# Patient Record
Sex: FEMALE | Race: WHITE | Employment: OTHER | URBAN - METROPOLITAN AREA
[De-identification: names, ages, dates, MRNs, and addresses within clinical notes are randomized per-mention and may not be internally consistent; named-entity substitution may affect disease eponyms.]

---

## 2022-06-29 ENCOUNTER — TELEPHONE (OUTPATIENT)
Dept: PULMONOLOGY | Age: 72
End: 2022-06-29

## 2022-06-30 ENCOUNTER — TELEPHONE (OUTPATIENT)
Dept: PULMONOLOGY | Age: 72
End: 2022-06-30

## 2022-06-30 ENCOUNTER — OFFICE VISIT (OUTPATIENT)
Dept: PULMONOLOGY | Age: 72
End: 2022-06-30
Payer: MEDICARE

## 2022-06-30 ENCOUNTER — HOSPITAL ENCOUNTER (OUTPATIENT)
Age: 72
Discharge: HOME | End: 2022-06-30
Payer: MEDICARE

## 2022-06-30 VITALS
OXYGEN SATURATION: 96 % | BODY MASS INDEX: 23.9 KG/M2 | DIASTOLIC BLOOD PRESSURE: 78 MMHG | SYSTOLIC BLOOD PRESSURE: 122 MMHG | HEART RATE: 84 BPM | HEIGHT: 64 IN | WEIGHT: 140 LBS

## 2022-06-30 DIAGNOSIS — Z01.818 PREOP TESTING: ICD-10-CM

## 2022-06-30 DIAGNOSIS — R91.1 PULMONARY NODULE: ICD-10-CM

## 2022-06-30 DIAGNOSIS — R91.1 PULMONARY NODULE: Primary | ICD-10-CM

## 2022-06-30 LAB
BASOPHILS ABSOLUTE: 0.1 K/UL (ref 0–0.2)
BASOPHILS RELATIVE PERCENT: 1.1 %
EOSINOPHILS ABSOLUTE: 1.7 K/UL (ref 0–0.6)
EOSINOPHILS RELATIVE PERCENT: 16.7 %
HCT VFR BLD CALC: 38.2 % (ref 36–48)
HEMOGLOBIN: 13.3 G/DL (ref 12–16)
INR BLD: 0.97 (ref 0.87–1.14)
LYMPHOCYTES ABSOLUTE: 2.5 K/UL (ref 1–5.1)
LYMPHOCYTES RELATIVE PERCENT: 24.6 %
MCH RBC QN AUTO: 30.9 PG (ref 26–34)
MCHC RBC AUTO-ENTMCNC: 34.8 G/DL (ref 31–36)
MCV RBC AUTO: 88.7 FL (ref 80–100)
MONOCYTES ABSOLUTE: 0.7 K/UL (ref 0–1.3)
MONOCYTES RELATIVE PERCENT: 6.5 %
NEUTROPHILS ABSOLUTE: 5.2 K/UL (ref 1.7–7.7)
NEUTROPHILS RELATIVE PERCENT: 51.1 %
PDW BLD-RTO: 14.2 % (ref 12.4–15.4)
PLATELET # BLD: 385 K/UL (ref 135–450)
PMV BLD AUTO: 6.9 FL (ref 5–10.5)
PROTHROMBIN TIME: 12.7 SEC (ref 11.7–14.5)
RBC # BLD: 4.31 M/UL (ref 4–5.2)
WBC # BLD: 10.2 K/UL (ref 4–11)

## 2022-06-30 PROCEDURE — 85025 COMPLETE CBC W/AUTO DIFF WBC: CPT

## 2022-06-30 PROCEDURE — 1123F ACP DISCUSS/DSCN MKR DOCD: CPT | Performed by: INTERNAL MEDICINE

## 2022-06-30 PROCEDURE — 36415 COLL VENOUS BLD VENIPUNCTURE: CPT

## 2022-06-30 PROCEDURE — 85610 PROTHROMBIN TIME: CPT

## 2022-06-30 PROCEDURE — 99214 OFFICE O/P EST MOD 30 MIN: CPT | Performed by: INTERNAL MEDICINE

## 2022-06-30 RX ORDER — ATORVASTATIN CALCIUM 40 MG/1
80 TABLET, FILM COATED ORAL DAILY
COMMUNITY
Start: 2022-05-05 | End: 2022-06-30

## 2022-06-30 RX ORDER — ALBUTEROL SULFATE 90 UG/1
AEROSOL, METERED RESPIRATORY (INHALATION)
COMMUNITY
Start: 2022-05-16

## 2022-06-30 RX ORDER — LEVOTHYROXINE SODIUM 25 UG/1
25 CAPSULE ORAL DAILY
COMMUNITY

## 2022-06-30 RX ORDER — ALBUTEROL SULFATE 2.5 MG/3ML
SOLUTION RESPIRATORY (INHALATION)
COMMUNITY
Start: 2022-05-16

## 2022-06-30 RX ORDER — ATORVASTATIN CALCIUM 80 MG/1
80 TABLET, FILM COATED ORAL DAILY
COMMUNITY
Start: 2022-06-17

## 2022-06-30 RX ORDER — AMLODIPINE BESYLATE AND BENAZEPRIL HYDROCHLORIDE 5; 40 MG/1; MG/1
1 CAPSULE ORAL DAILY
COMMUNITY
Start: 2022-03-15

## 2022-06-30 NOTE — TELEPHONE ENCOUNTER
EBUS no fluoro demetrio 7/1/22 @ 11:30am arrival 10:30am; patient aware of date time and prep. Watch for results.

## 2022-06-30 NOTE — PATIENT INSTRUCTIONS
Bronchoscopy has been set up for 7/1/22 @ 1:30pm arrival time 12:30pm at [facility]. Please enter at [facility]. Nothing to eat or drink after midnight prior to the procedure. Must have a  to bring you to and from the procedure. Hold blood thinners as instructed prior to the procedure.

## 2022-06-30 NOTE — PROGRESS NOTES
Chief Complaint: Mediastinal mass    Consulting provider: No ref. provider found    HPI: 74 y.o. female patient is being seen at the request of No ref. provider found for a consultation regarding an abnormal chest CT concerning for lung cancer. The patient has a history of . The patient does not have SOB at rest but has TRINIDAD. Exercise tolerance on level ground is on level ground but TRINIDAD up hill. The patient has a daily intermittent cough that is usually dry. The patient does wheeze intermittently. No weight loss. No fever. Heavy past smoker, now cut back to 0.25 PPD  TB exposure: no    The information above included the review and summarization of old records. The history was obtained from these old records and from the patient directly. Outside information from the referring provider regarding the chief complaint was reviewed. *  REVIEW OF SYSTEMS:    CONSTITUTIONAL: Also see HPI. Negative for fevers and chills and sweats. No unintentional weight loss more than 5 lbs. EYES: Also see HPI. no conjunctival injection or drainage. No itching or pain or tearing. ENT: Also see HPI. No post nasal drip, sinus pressure, nasal congestion/rhinorrhea, ear aches, snoring  RESPIRATORY:  Also see HPI. No hemoptysis or pleuritic pain. CARDIOVASCULAR: Negative for chest pain, palpitations, PND, orthopnea  GASTROINTESTINAL: Negative for acid reflux or heart burn. Negative for nausea, vomiting, difficulty swallowing, diarrhea, constipation and abdominal pain  MUSCULOSKELETAL:  No neck or back pain or arthritis. No arthralgias. No muscle weakness. HEMATOLOGICAL/LYMPH: Negative for adenopathy  SKIN:  No rash or nodules  EXTREMITIES: Negative for cyanosis or edema or raynaud's symptoms   NEUROLOGICAL: No Anxiety or depression. Negative for Syncope. Negative for seizures.      Past Medical History:   Diagnosis Date    AAA (abdominal aortic aneurysm) (HCC)     COPD (chronic obstructive pulmonary disease) (HCC)      Past Surgical place and time. LABS:  The recent relevant labs were reviewed    PFTs Date  FVC  (%) FEV1  (%) FEV1/FVC ratio    TLC  (%)  RV  (%)   DLCO (%) Bronchodilator response:    IMAGING:  I personally reviewed and interpreted the following imaging today in the office:   6/17/22 @ [facility] Chest CT:  Mediastinum: Thyroid is homogeneous. No mediastinal mass with an enlarged    paratracheal lymph node identified on the right. This lymph node measures    2.8 x 2.6 cm. There is a soft tissue mass identified in the subcarinal    region extending into the right hilar region measuring 6.6 x 4.2 cm. There    is narrowing identified of the right main pulmonary artery and right lower lobe pulmonary artery with in case mint identified of the arteries and the    segmental and subsegmental branches extending into the right lower lobe.    There is postobstructive collapse involving the right lower lobe. Findings    most consistent with malignancy. There is saccular aneurysmal dilatation    identified of the thoracic aorta in the arch measuring 3.3 cm. The ascending    thoracic aorta largest AP dimension is 3.4 cm and is descending thoracic    aorta measures 2.3 cm.    Lungs/pleura: There is postobstructive collapse or infiltrate seen in the    right lower lobe. Minimal fiber nodular densities and ground-glass opacity    seen centrally within the right middle and upper lung field. The left lung    is clear.    Upper Abdomen: Visualized upper abdomen is revealing aneurysmal dilatation    incompletely imaged of the abdominal aorta at 3.4 cm.    Soft Tissues/Bones: No acute chest wall abnormality. Degenerative changes    seen within the spine.        ASSESSMENT:  · Mediastinal mass extending to th right hilum with mass effect on the right PA and causing collapse of the RLL suspicious for Small cell lung cancer  · Tobacco abuse  · CAD with h/o stents  · AAA    PLAN:   · CBC, INR  · Continue prn albuterol  · Continue to hold plavix and aspirin  · Tobacco cessation discussed  · Risks and benefits of bronchoscopy were discussed with patient including complications (collapse lung, bleed, mechanical ventilation and cardiopulmonary arrest).  The patient is agreeable  · Plan EBUS guided biopsy of the mass tomorrow    Thank you for the referral

## 2022-07-01 NOTE — TELEPHONE ENCOUNTER
Per Dr. Suleman Denton, anesthesia canceled the case due to an aneurysm. Anesthesia [facility] Per Dr. Suleman Denton, place referral to [facility] for bronch to be done, and he will communicate with the physician taking urgent consults to inform of what's going on. Referral placed and faxed. \"The attending anesthesiology discussed with me the increased risk of abdominal aneurysm rupture and we agreed that it would be safest for the patient for to have the biopsy done at a facility with a vascular surgeon present. I did speak with the referring oncologist about the case. The oncologist was aware of the large abdominal aneurysm. In fact, the oncologist had been contacted by the vascular surgery requesting that the lung mass biopsy and any related procedures be done before the aneurysm was addressed. Despite this being known and the patient wanted to proceed with biopsy, I do agree with anesthesia that it would be better to do at a facility with vascular surgery on-call. I explained to the patient and to the referring physician that I would refer the patient to [facility] where the biopsy can also be done and also does have vascular surgery on-call. \"

## 2022-07-05 NOTE — TELEPHONE ENCOUNTER
Can arrange for bronchoscopy with EBUS under anesthesia and pathology at bedside with no fluoroscopy someday this week at [facility] if anesthesia is ok with it here

## 2022-07-08 NOTE — TELEPHONE ENCOUNTER
Patient returned call to the office but was scheduled for a new patient and not as noted. Patient has been left messages to contact the office to correct the appointment.

## 2022-07-10 NOTE — PROGRESS NOTES
encounter. No follow-ups on file. Chief Complaint:   New Patient (R/B S Esther roland Pulmonary Nodule)       HPI: Wilbur Baird is a 74y.o. year old female here for continued management of findings on CT chest requiring diagnostic bronchoscopy with EBUS, sampling in a patient with a suspected malignant disorder. Her PCP is Koffi Berman, she has been evaluated by Dr. Marquis Shaikh at [facility], Dr. Susanna Ng, vascular surgeon at [address] [place] [address] and Dr. Junior Acevedo, cardiologist at [address] [place] [address]. Farhad Ferrara was evaluated by Dr. Marquis Shaikh at [facility] on 2022, was set up to undergo bronchoscopy with EBUS on 2022. Prior history was documented by Dr. Suad Dickson, reviewed by me. Anesthesiology progress note the procedure should be performed at a facility where vascular surgery was available. The patient is here to schedule bronchoscopy with EBUS. She is accompanied by her daughter RONN. Farhad Ferrara is a pleasant 41-year-old female living in [place]. Her   of alcoholism, her grandson lives with her. 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 She started smoking in , smoked about 1 PPD for the most part. In the last few months she has cut back to 0.5 PPD or less. She denies prior history of COPD. The patient had abdominal pain, tells me that she was evaluated with a CT abdomen that showed a small nonobstructing calculus with UTI. During that imaging she was found to have a 7.2 cm AAA. There was some stranding at the pancreatic tail, possibly related to pancreatitis. She was sent to Dr. Lucy Dozier, who saw her on 2022, felt she needed CT angiogram.  He felt that the aneurysm should be dealt with after the pancreatitis had resolved. She was also referred to Dr. Junior Acevedo for cardiac clearance. He saw her on 2022, recommended a Lexiscan. The patient says she is not going to do it as she has not had any chest pain. Incidentally, CT abdomen suggested right lung collapse, a CT chest was ordered and was found to have several abnormalities as below. She was seen in consultation by Dr. Magali Jj, oncologist.  He referred her to the pulmonary group at [facility] for bronchoscopy and possible consideration of stent placement. He felt she could have small cell lung cancer. The patient does have exertional shortness of breath if she is walking uphill. On level ground she can walk without difficulty. She is out of breath when she is working in the yard. She does have cough with clear phlegm, denies hemoptysis. She denies nasal allergies or GE reflux. She feels when she burps, the air will not come up. She had a UTI recently was diagnosed with kidney stone. She has no bowel complaints. She has not had any weight loss, appetite is fair. The rest of her ROS was negative. She did stop her Plavix and aspirin in anticipation for bronchoscopy. CT chest 6/17/2022 [facility]  Mediastinal mass with extension into the right infrahilar region with adenopathy in the right paratracheal region most consistent with malignancy and metastatic disease with postobstructive collapse or pneumonia seen. Saccular aneurysm seen of the through thoracic aortic arch at 3.3 cm with a 3.4 cm ascending thoracic aortic ectasia and infrarenal abdominal aortic aneurysm identified at 3.4 cm. RECOMMENDATIONS:   3.4 cm abdominal aortic aneurysm recommend follow-up every 3 years. CT angio abdomen and pelvis with contrast 6/16/2022  7.2 cm infrarenal abdominal aortic aneurysm, unchanged from recent comparison. Moderate stenosis of the iliac arteries bilaterally, with foci of high-grade stenosis within the proximal external iliac arteries. Nonenhancement of the lower pole of the left kidney likely secondary to infarct. Unchanged inflammatory stranding surrounding the pancreatic tail, likely pancreatitis. .     Partially depression. Negative for Syncope. Negative for seizures.      Past Medical History        Past Medical History:   Diagnosis Date    AAA (abdominal aortic aneurysm) (HCC)      COPD (chronic obstructive pulmonary disease) (HCC)           Past Surgical History  Past Surgical History             Procedure Laterality Date    CORONARY ANGIOPLASTY WITH STENT PLACEMENT         x2         Social History:    TOBACCO:   reports that she has been smoking. She has been smoking about 0.25 packs per day. She has never used smokeless tobacco.  ETOH:   has no history on file for alcohol use.     Family History  Family History             Problem Relation Age of Onset    Diabetes Father      Heart Failure Father      Hypertension Father      Asthma Other      Hypertension Other           Allergies:  has No Known Allergies.       Current Medication      Current Outpatient Medications:     amLODIPine-benazepril (LOTREL) 5-40 MG per capsule, Take 1 capsule by mouth daily, Disp: , Rfl:     albuterol (PROVENTIL) (2.5 MG/3ML) 0.083% nebulizer solution, USE 1 VIAL IN NEBULIZER EVERY 4 TO 6 HOURS AS NEEDED, Disp: , Rfl:     albuterol sulfate HFA (PROVENTIL;VENTOLIN;PROAIR) 108 (90 Base) MCG/ACT inhaler, INHALE 2 PUFFS BY MOUTH EVERY 4 TO 6 HOURS AS NEEDED, Disp: , Rfl:     atorvastatin (LIPITOR) 80 MG tablet, Take 80 mg by mouth daily, Disp: , Rfl:     Levothyroxine Sodium 25 MCG CAPS, Take 25 mg by mouth daily, Disp: , Rfl:     metoprolol tartrate (LOPRESSOR) 25 MG tablet, Take 25 mg by mouth daily, Disp: , Rfl:         Objective:   PHYSICAL EXAM:   /78 (Site: Left Upper Arm, Position: Sitting, Cuff Size: Medium Adult)   Pulse 84   Ht 5' 4\" (1.626 m)   Wt 140 lb (63.5 kg)   SpO2 96% Comment: RA  BMI 24.03 kg/m²    Constitutional:  No acute distress. Eyes: PERRL. Conjunctivae anicteric. ENT: Normal nose. Normal tongue. Oropharynx is clear. Neck:  Trachea is midline. No thyroid tenderness.   Respiratory: No accessory muscle usage. + wheezes. No rales. No Rhonchi. Cardiovascular: Normal S1S2. No digit clubbing. No digit cyanosis. No LE edema. Lymphatic: No cervical or supraclavicular adenopathy. Skin: No rash on the exposed extremities. No Nodules or induration on exposed extremities. Psychiatric: No anxiety or Agitation. Alert and Oriented to person, place and time.     LABS:  The recent relevant labs were reviewed     PFTs Date  FVC  (%) FEV1  (%) FEV1/FVC ratio    TLC  (%)  RV  (%)   DLCO (%) Bronchodilator response:     IMAGING:  I personally reviewed and interpreted the following imaging today in the office:   6/17/22 @ [facility] Chest CT:  Mediastinum: Thyroid is homogeneous. No mediastinal mass with an enlarged paratracheal lymph node identified on the right. This lymph node measures 2.8 x 2.6 cm. There is a soft tissue mass identified in the subcarinal region extending into the right hilar region measuring 6.6 x 4.2 cm. There is narrowing identified of the right main pulmonary artery and right lower lobe pulmonary artery with in case mint identified of the arteries and the segmental and subsegmental branches extending into the right lower lobe. There is postobstructive collapse involving the right lower lobe. Findings most consistent with malignancy. There is saccular aneurysmal dilatation  identified of the thoracic aorta in the arch measuring 3.3 cm. The ascending thoracic aorta largest AP dimension is 3.4 cm and is descending thoracic aorta measures 2.3 cm. Lungs/pleura: There is postobstructive collapse or infiltrate seen in the right lower lobe. Minimal fiber nodular densities and ground-glass opacity seen centrally within the right middle and upper lung field. The left lung is clear. Upper Abdomen: Visualized upper abdomen is revealing aneurysmal dilatation incompletely imaged of the abdominal aorta at 3.4 cm. Soft Tissues/Bones: No acute chest wall abnormality.  Degenerative changes seen within the spine.         ASSESSMENT:  · Mediastinal mass extending to th right hilum with mass effect on the right PA and causing collapse of the RLL suspicious for Small cell lung cancer  · Tobacco abuse  · CAD with h/o stents  · AAA     PLAN:   · CBC, INR  · Continue prn albuterol  · Continue to hold plavix and aspirin  · Tobacco cessation discussed  · Risks and benefits of bronchoscopy were discussed with patient including complications (collapse lung, bleed, mechanical ventilation and cardiopulmonary arrest).  The patient is agreeable  · Plan EBUS guided biopsy of the mass tomorrow         Past Medical History:   Diagnosis Date    [facility]     CAD (coronary artery disease)     COPD [facility]chronic obstructive [facility] disease) ([facility]     Hyperlipidemia     Hypertension     Thyroid disease        Past Surgical History:   Procedure Laterality Date    CORONARY ANGIOPLASTY WITH STENT PLACEMENT      x2       Social History     Tobacco Use    Smoking status: Current Every Day Smoker     Packs/day: 0.25     Start date: 5/6/1960    Smokeless tobacco: Never Used   Substance Use Topics    Alcohol use: Not Currently       Family History   Problem Relation Age of Onset    Diabetes Father     Heart Failure Father     Hypertension Father     Asthma Other     Hypertension Other          Current Outpatient Medications:     sodium bicarbonate 325 MG tablet, Take 325 mg by mouth 2 times daily, Disp: , Rfl:     albuterol (PROVENTIL) (2.5 MG/3ML) 0.083% nebulizer solution, USE 1 VIAL IN NEBULIZER EVERY 4 TO 6 HOURS AS NEEDED, Disp: , Rfl:     albuterol sulfate HFA (PROVENTIL;VENTOLIN;PROAIR) 108 (90 Base) MCG/ACT inhaler, INHALE 2 PUFFS BY MOUTH EVERY 4 TO 6 HOURS AS NEEDED, Disp: , Rfl:     amLODIPine-benazepril (LOTREL) 5-40 MG per capsule, Take 1 capsule by mouth daily, Disp: , Rfl:     atorvastatin (LIPITOR) 80 MG tablet, Take 80 mg by mouth daily, Disp: , Rfl:     Levothyroxine Sodium 25 MCG CAPS, Take 25 mg by mouth daily, Disp: , Rfl:     metoprolol tartrate (LOPRESSOR) 25 MG tablet, Take 25 mg by mouth daily, Disp: , Rfl:     Patient has no known allergies. Vitals:    07/11/22 1041   BP: (!) 154/98   Site: Left Upper Arm   Position: Sitting   Cuff Size: Medium Adult   Pulse: 82   Temp: 97 °F (36.1 °C)   TempSrc: Temporal   SpO2: 96%   Weight: 141 lb 9.6 oz (64.2 kg)   Height: 5' 4\" (1.626 m)       Review of Systems   Constitutional: Negative for appetite change, chills, diaphoresis, fatigue and fever. HENT: Negative for congestion, nosebleeds, postnasal drip, rhinorrhea, sinus pressure, sneezing, sore throat, trouble swallowing and voice change. Eyes: Negative for discharge, redness, itching and visual disturbance. Respiratory: Positive for cough and shortness of breath. Negative for apnea, choking, chest tightness, wheezing and stridor. Cardiovascular: Negative for chest pain, palpitations and leg swelling. Gastrointestinal: Negative for abdominal distention, abdominal pain, blood in stool, constipation, diarrhea, nausea and vomiting. Endocrine: Negative for polyuria. Genitourinary: Negative for decreased urine volume, difficulty urinating, dysuria, enuresis, frequency, hematuria and urgency. Musculoskeletal: Negative for arthralgias, back pain, gait problem, joint swelling and myalgias. Skin: Negative for rash. Allergic/Immunologic: Negative for environmental allergies and immunocompromised state. Neurological: Negative for dizziness, tremors, seizures, weakness, light-headedness and headaches. Hematological: Does not bruise/bleed easily. Psychiatric/Behavioral: Negative for agitation, behavioral problems, confusion, hallucinations and sleep disturbance. All other systems reviewed and are negative. Physical Exam  Vitals reviewed. Constitutional:       General: She is not in acute distress. Appearance: She is well-developed.  She is not ill-appearing, toxic-appearing or diaphoretic. Comments: Pleasant, averagely built   HENT:      Head: Normocephalic and atraumatic. Comments: Mild bitemporal muscle wasting     Nose: Nose normal. No congestion or rhinorrhea. Mouth/Throat:      Mouth: Mucous membranes are moist.      Pharynx: Oropharynx is clear. No oropharyngeal exudate or posterior oropharyngeal erythema. Comments: Upper denture, lower jaw edentulous. Class III airway  Eyes:      General: No scleral icterus. Right eye: No discharge. Left eye: No discharge. Conjunctiva/sclera: Conjunctivae normal.      Pupils: Pupils are equal, round, and reactive to light. Comments: Glasses   Neck:      Thyroid: No thyromegaly. Vascular: No JVD. Trachea: No tracheal deviation. Comments: No JVD  Cardiovascular:      Rate and Rhythm: Normal rate and regular rhythm. Heart sounds: Normal heart sounds. No murmur heard. No friction rub. No gallop. Pulmonary:      Effort: Pulmonary effort is normal. No respiratory distress. Breath sounds: No stridor. Rales (Few right basal crackles) present. No wheezing. Abdominal:      General: There is no distension. Palpations: Abdomen is soft. Tenderness: There is no abdominal tenderness. There is no guarding or rebound. Musculoskeletal:      Right lower leg: No edema. Left lower leg: No edema. Lymphadenopathy:      Cervical: No cervical adenopathy. Skin:     General: Skin is warm and dry. Coloration: Skin is not jaundiced or pale. Findings: No bruising, erythema, lesion or rash. Neurological:      Mental Status: She is alert and oriented to person, place, and time.       Gait: Gait normal.   Psychiatric:         Mood and Affect: Mood normal.         Behavior: Behavior normal.

## 2022-07-11 ENCOUNTER — OFFICE VISIT (OUTPATIENT)
Dept: PULMONOLOGY | Age: 72
End: 2022-07-11
Payer: MEDICARE

## 2022-07-11 VITALS
HEIGHT: 64 IN | TEMPERATURE: 97 F | DIASTOLIC BLOOD PRESSURE: 98 MMHG | BODY MASS INDEX: 24.17 KG/M2 | OXYGEN SATURATION: 96 % | WEIGHT: 141.6 LBS | SYSTOLIC BLOOD PRESSURE: 154 MMHG | HEART RATE: 82 BPM

## 2022-07-11 DIAGNOSIS — R59.0 MEDIASTINAL ADENOPATHY: Primary | ICD-10-CM

## 2022-07-11 DIAGNOSIS — R59.0 HILAR ADENOPATHY: ICD-10-CM

## 2022-07-11 DIAGNOSIS — I71.40 ABDOMINAL AORTIC ANEURYSM (AAA) GREATER THAN 5.0 CM IN DIAMETER IN FEMALE: ICD-10-CM

## 2022-07-11 DIAGNOSIS — J98.11 COLLAPSE OF RIGHT LUNG: ICD-10-CM

## 2022-07-11 DIAGNOSIS — J44.9 CHRONIC OBSTRUCTIVE PULMONARY DISEASE, UNSPECIFIED COPD TYPE (HCC): ICD-10-CM

## 2022-07-11 DIAGNOSIS — I71.20 THORACIC AORTIC ANEURYSM WITHOUT RUPTURE: ICD-10-CM

## 2022-07-11 DIAGNOSIS — F17.200 SMOKER: ICD-10-CM

## 2022-07-11 PROCEDURE — 99214 OFFICE O/P EST MOD 30 MIN: CPT | Performed by: INTERNAL MEDICINE

## 2022-07-11 PROCEDURE — 1123F ACP DISCUSS/DSCN MKR DOCD: CPT | Performed by: INTERNAL MEDICINE

## 2022-07-11 ASSESSMENT — ENCOUNTER SYMPTOMS
CONSTIPATION: 0
VOICE CHANGE: 0
BACK PAIN: 0
EYE DISCHARGE: 0
COUGH: 1
ABDOMINAL PAIN: 0
STRIDOR: 0
SINUS PRESSURE: 0
NAUSEA: 0
EYE REDNESS: 0
CHOKING: 0
RHINORRHEA: 0
EYE ITCHING: 0
VOMITING: 0
WHEEZING: 0
SORE THROAT: 0
DIARRHEA: 0
BLOOD IN STOOL: 0
APNEA: 0
CHEST TIGHTNESS: 0
ABDOMINAL DISTENTION: 0
SHORTNESS OF BREATH: 1
TROUBLE SWALLOWING: 0

## 2022-07-11 ASSESSMENT — SLEEP AND FATIGUE QUESTIONNAIRES
HOW LIKELY ARE YOU TO NOD OFF OR FALL ASLEEP WHILE SITTING AND TALKING TO SOMEONE: 0
HOW LIKELY ARE YOU TO NOD OFF OR FALL ASLEEP WHEN YOU ARE A PASSENGER IN A CAR FOR AN HOUR WITHOUT A BREAK: 0
HOW LIKELY ARE YOU TO NOD OFF OR FALL ASLEEP WHILE WATCHING TV: 0
ESS TOTAL SCORE: 1
NECK CIRCUMFERENCE (INCHES): 15.5
HOW LIKELY ARE YOU TO NOD OFF OR FALL ASLEEP IN A CAR, WHILE STOPPED FOR A FEW MINUTES IN TRAFFIC: 0
HOW LIKELY ARE YOU TO NOD OFF OR FALL ASLEEP WHILE SITTING QUIETLY AFTER LUNCH WITHOUT ALCOHOL: 0
HOW LIKELY ARE YOU TO NOD OFF OR FALL ASLEEP WHILE SITTING INACTIVE IN A PUBLIC PLACE: 0
HOW LIKELY ARE YOU TO NOD OFF OR FALL ASLEEP WHILE LYING DOWN TO REST IN THE AFTERNOON WHEN CIRCUMSTANCES PERMIT: 1
HOW LIKELY ARE YOU TO NOD OFF OR FALL ASLEEP WHILE SITTING AND READING: 0

## 2022-07-11 NOTE — PROGRESS NOTES
MA Communication: The following orders are received by verbal communication from Geovanna Katz MD    Orders include: Bronchoscopy EBUS Friday 7/15/22 11:00 am Pt. To arrive at 10.

## 2022-07-11 NOTE — LETTER
[facility]  [address] [place] [address]  Phone: [phone]  Fax: [phone]    Sanjana Kinney MD    July 11, 2022     Brad Shepherd  [address] [place] [address]. [address] [address]    Patient: Debbie Kaur   MR Number: [id number]   YOB: 1950   Date of Visit: 7/11/2022       Dear Brad Shepherd: Thank you for referring Debbie Kaur to me for evaluation/treatment. Below are the relevant portions of my assessment and plan of care. If you have questions, please do not hesitate to call me. I look forward to following Jaylen Molina along with you.     Sincerely,      Sanjana Kinney MD

## 2022-07-13 RX ORDER — CLOPIDOGREL BISULFATE 75 MG/1
75 TABLET ORAL DAILY
COMMUNITY

## 2022-07-13 RX ORDER — ASPIRIN 325 MG
325 TABLET ORAL DAILY
COMMUNITY

## 2022-07-13 NOTE — PROGRESS NOTES
Obstructive Sleep Apnea (ANA) Screening     Patient:  Germán Pickett    YOB: 1950      Medical Record #:  [id number]                     Date:  7/13/2022     1. Are you a loud and/or regular snorer? []  Yes       [x] No    2. Have you been observed to gasp or stop breathing during sleep? []  Yes       [x] No    3. Do you feel tired or groggy upon awakening or do you awaken with a headache?           []  Yes       [] No    4. Are you often tired or fatigued during the wake time hours? []  Yes       [] No    5. Do you fall asleep sitting, reading, watching TV or driving? []  Yes       [] No    6. Do you often have problems with memory or concentration? []  Yes       [] No    **If patient's score is ? 3 they are considered high risk for ANA. An Anesthesia provider will evaluate the patient and develop a plan of care the day of surgery. Note:  If the patient's BMI is more than 35 kg m¯² , has neck circumference > 40 cm, and/or high blood pressure the risk is greater (© American [employer]nea Association, 2006).

## 2022-07-13 NOTE — PROGRESS NOTES

## 2022-07-15 ENCOUNTER — ANESTHESIA (OUTPATIENT)
Dept: ENDOSCOPY | Age: 72
End: 2022-07-15
Payer: MEDICARE

## 2022-07-15 ENCOUNTER — ANESTHESIA EVENT (OUTPATIENT)
Dept: ENDOSCOPY | Age: 72
End: 2022-07-15
Payer: MEDICARE

## 2022-07-15 ENCOUNTER — HOSPITAL ENCOUNTER (OUTPATIENT)
Age: 72
Setting detail: OUTPATIENT SURGERY
Discharge: HOME | End: 2022-07-15
Attending: INTERNAL MEDICINE | Admitting: INTERNAL MEDICINE
Payer: MEDICARE

## 2022-07-15 VITALS
BODY MASS INDEX: 23.9 KG/M2 | WEIGHT: 140 LBS | RESPIRATION RATE: 20 BRPM | TEMPERATURE: 96.9 F | DIASTOLIC BLOOD PRESSURE: 61 MMHG | OXYGEN SATURATION: 95 % | SYSTOLIC BLOOD PRESSURE: 121 MMHG | HEART RATE: 64 BPM | HEIGHT: 64 IN

## 2022-07-15 LAB
ABO/RH: NORMAL
ANTIBODY SCREEN: NORMAL

## 2022-07-15 PROCEDURE — 86850 RBC ANTIBODY SCREEN: CPT

## 2022-07-15 PROCEDURE — 88185 FLOWCYTOMETRY/TC ADD-ON: CPT

## 2022-07-15 PROCEDURE — 2580000003 HC RX 258

## 2022-07-15 PROCEDURE — 88333 PATH CONSLTJ SURG CYTO XM 1: CPT

## 2022-07-15 PROCEDURE — 3603165200 HC BRNCHSC EBUS GUIDED SAMPL 1/2 NODE STATION/STRUX: Performed by: INTERNAL MEDICINE

## 2022-07-15 PROCEDURE — 2500000003 HC RX 250 WO HCPCS

## 2022-07-15 PROCEDURE — 88305 TISSUE EXAM BY PATHOLOGIST: CPT

## 2022-07-15 PROCEDURE — 3609011200 HC BRONCHOSCOPY W/TRANSBRONCL NDL ASPIR BX EA ADDL LOBE: Performed by: INTERNAL MEDICINE

## 2022-07-15 PROCEDURE — 7100000010 HC PHASE II RECOVERY - FIRST 15 MIN: Performed by: INTERNAL MEDICINE

## 2022-07-15 PROCEDURE — 36415 COLL VENOUS BLD VENIPUNCTURE: CPT

## 2022-07-15 PROCEDURE — 3700000000 HC ANESTHESIA ATTENDED CARE: Performed by: INTERNAL MEDICINE

## 2022-07-15 PROCEDURE — 88342 IMHCHEM/IMCYTCHM 1ST ANTB: CPT

## 2022-07-15 PROCEDURE — 3609011900 HC BRONCHOSCOPY NEEDLE BX TRACHEA MAIN STEM&/BRON: Performed by: INTERNAL MEDICINE

## 2022-07-15 PROCEDURE — 88173 CYTOPATH EVAL FNA REPORT: CPT

## 2022-07-15 PROCEDURE — 88360 TUMOR IMMUNOHISTOCHEM/MANUAL: CPT

## 2022-07-15 PROCEDURE — 2580000003 HC RX 258: Performed by: ANESTHESIOLOGY

## 2022-07-15 PROCEDURE — 86901 BLOOD TYPING SEROLOGIC RH(D): CPT

## 2022-07-15 PROCEDURE — 3700000001 HC ADD 15 MINUTES (ANESTHESIA): Performed by: INTERNAL MEDICINE

## 2022-07-15 PROCEDURE — 31623 DX BRONCHOSCOPE/BRUSH: CPT | Performed by: INTERNAL MEDICINE

## 2022-07-15 PROCEDURE — 2580000003 HC RX 258: Performed by: INTERNAL MEDICINE

## 2022-07-15 PROCEDURE — 88112 CYTOPATH CELL ENHANCE TECH: CPT

## 2022-07-15 PROCEDURE — 2720000010 HC SURG SUPPLY STERILE: Performed by: INTERNAL MEDICINE

## 2022-07-15 PROCEDURE — 31652 BRONCH EBUS SAMPLNG 1/2 NODE: CPT | Performed by: INTERNAL MEDICINE

## 2022-07-15 PROCEDURE — 2709999900 HC NON-CHARGEABLE SUPPLY: Performed by: INTERNAL MEDICINE

## 2022-07-15 PROCEDURE — 88341 IMHCHEM/IMCYTCHM EA ADD ANTB: CPT

## 2022-07-15 PROCEDURE — 3609011100 HC BRONCHOSCOPY BRUSHINGS: Performed by: INTERNAL MEDICINE

## 2022-07-15 PROCEDURE — 7100000011 HC PHASE II RECOVERY - ADDTL 15 MIN: Performed by: INTERNAL MEDICINE

## 2022-07-15 PROCEDURE — 88172 CYTP DX EVAL FNA 1ST EA SITE: CPT

## 2022-07-15 PROCEDURE — 86900 BLOOD TYPING SEROLOGIC ABO: CPT

## 2022-07-15 PROCEDURE — 88184 FLOWCYTOMETRY/ TC 1 MARKER: CPT

## 2022-07-15 PROCEDURE — 6370000000 HC RX 637 (ALT 250 FOR IP)

## 2022-07-15 PROCEDURE — 6360000002 HC RX W HCPCS

## 2022-07-15 PROCEDURE — 88177 CYTP FNA EVAL EA ADDL: CPT

## 2022-07-15 RX ORDER — MEPERIDINE HYDROCHLORIDE 50 MG/ML
12.5 INJECTION INTRAMUSCULAR; INTRAVENOUS; SUBCUTANEOUS EVERY 5 MIN PRN
Status: DISCONTINUED | OUTPATIENT
Start: 2022-07-15 | End: 2022-07-15 | Stop reason: HOSPADM

## 2022-07-15 RX ORDER — SODIUM CHLORIDE 9 MG/ML
INJECTION, SOLUTION INTRAVENOUS CONTINUOUS
Status: DISCONTINUED | OUTPATIENT
Start: 2022-07-15 | End: 2022-07-15 | Stop reason: HOSPADM

## 2022-07-15 RX ORDER — LIDOCAINE HYDROCHLORIDE 10 MG/ML
0.3 INJECTION, SOLUTION EPIDURAL; INFILTRATION; INTRACAUDAL; PERINEURAL
Status: DISCONTINUED | OUTPATIENT
Start: 2022-07-15 | End: 2022-07-15 | Stop reason: HOSPADM

## 2022-07-15 RX ORDER — PHENYLEPHRINE HCL IN 0.9% NACL 1 MG/10 ML
SYRINGE (ML) INTRAVENOUS PRN
Status: DISCONTINUED | OUTPATIENT
Start: 2022-07-15 | End: 2022-07-15 | Stop reason: SDUPTHER

## 2022-07-15 RX ORDER — IPRATROPIUM BROMIDE AND ALBUTEROL SULFATE 2.5; .5 MG/3ML; MG/3ML
1 SOLUTION RESPIRATORY (INHALATION) ONCE
Status: DISCONTINUED | OUTPATIENT
Start: 2022-07-15 | End: 2022-07-15 | Stop reason: HOSPADM

## 2022-07-15 RX ORDER — ONDANSETRON 2 MG/ML
INJECTION INTRAMUSCULAR; INTRAVENOUS PRN
Status: DISCONTINUED | OUTPATIENT
Start: 2022-07-15 | End: 2022-07-15 | Stop reason: SDUPTHER

## 2022-07-15 RX ORDER — SODIUM CHLORIDE, SODIUM LACTATE, POTASSIUM CHLORIDE, CALCIUM CHLORIDE 600; 310; 30; 20 MG/100ML; MG/100ML; MG/100ML; MG/100ML
INJECTION, SOLUTION INTRAVENOUS CONTINUOUS PRN
Status: DISCONTINUED | OUTPATIENT
Start: 2022-07-15 | End: 2022-07-15 | Stop reason: SDUPTHER

## 2022-07-15 RX ORDER — ROCURONIUM BROMIDE 10 MG/ML
INJECTION, SOLUTION INTRAVENOUS PRN
Status: DISCONTINUED | OUTPATIENT
Start: 2022-07-15 | End: 2022-07-15 | Stop reason: SDUPTHER

## 2022-07-15 RX ORDER — SODIUM CHLORIDE 0.9 % (FLUSH) 0.9 %
5-40 SYRINGE (ML) INJECTION EVERY 12 HOURS SCHEDULED
Status: DISCONTINUED | OUTPATIENT
Start: 2022-07-15 | End: 2022-07-15 | Stop reason: HOSPADM

## 2022-07-15 RX ORDER — SODIUM CHLORIDE 0.9 % (FLUSH) 0.9 %
5-40 SYRINGE (ML) INJECTION PRN
Status: DISCONTINUED | OUTPATIENT
Start: 2022-07-15 | End: 2022-07-15 | Stop reason: HOSPADM

## 2022-07-15 RX ORDER — LIDOCAINE HYDROCHLORIDE 20 MG/ML
INJECTION, SOLUTION EPIDURAL; INFILTRATION; INTRACAUDAL; PERINEURAL PRN
Status: DISCONTINUED | OUTPATIENT
Start: 2022-07-15 | End: 2022-07-15 | Stop reason: SDUPTHER

## 2022-07-15 RX ORDER — ESMOLOL HYDROCHLORIDE 10 MG/ML
INJECTION INTRAVENOUS PRN
Status: DISCONTINUED | OUTPATIENT
Start: 2022-07-15 | End: 2022-07-15 | Stop reason: SDUPTHER

## 2022-07-15 RX ORDER — SODIUM CHLORIDE 9 MG/ML
INJECTION, SOLUTION INTRAVENOUS PRN
Status: DISCONTINUED | OUTPATIENT
Start: 2022-07-15 | End: 2022-07-15 | Stop reason: HOSPADM

## 2022-07-15 RX ORDER — PROPOFOL 10 MG/ML
INJECTION, EMULSION INTRAVENOUS PRN
Status: DISCONTINUED | OUTPATIENT
Start: 2022-07-15 | End: 2022-07-15 | Stop reason: SDUPTHER

## 2022-07-15 RX ORDER — IPRATROPIUM BROMIDE AND ALBUTEROL SULFATE 2.5; .5 MG/3ML; MG/3ML
SOLUTION RESPIRATORY (INHALATION)
Status: COMPLETED
Start: 2022-07-15 | End: 2022-07-15

## 2022-07-15 RX ORDER — SODIUM CHLORIDE, SODIUM LACTATE, POTASSIUM CHLORIDE, CALCIUM CHLORIDE 600; 310; 30; 20 MG/100ML; MG/100ML; MG/100ML; MG/100ML
INJECTION, SOLUTION INTRAVENOUS CONTINUOUS
Status: DISCONTINUED | OUTPATIENT
Start: 2022-07-15 | End: 2022-07-15 | Stop reason: HOSPADM

## 2022-07-15 RX ORDER — FENTANYL CITRATE 50 UG/ML
25 INJECTION, SOLUTION INTRAMUSCULAR; INTRAVENOUS EVERY 5 MIN PRN
Status: DISCONTINUED | OUTPATIENT
Start: 2022-07-15 | End: 2022-07-15 | Stop reason: HOSPADM

## 2022-07-15 RX ORDER — DROPERIDOL 2.5 MG/ML
0.62 INJECTION, SOLUTION INTRAMUSCULAR; INTRAVENOUS
Status: DISCONTINUED | OUTPATIENT
Start: 2022-07-15 | End: 2022-07-15 | Stop reason: HOSPADM

## 2022-07-15 RX ORDER — ONDANSETRON 2 MG/ML
4 INJECTION INTRAMUSCULAR; INTRAVENOUS
Status: DISCONTINUED | OUTPATIENT
Start: 2022-07-15 | End: 2022-07-15 | Stop reason: HOSPADM

## 2022-07-15 RX ADMIN — ONDANSETRON 4 MG: 2 INJECTION INTRAMUSCULAR; INTRAVENOUS at 11:34

## 2022-07-15 RX ADMIN — IPRATROPIUM BROMIDE AND ALBUTEROL SULFATE: .5; 3 SOLUTION RESPIRATORY (INHALATION) at 11:15

## 2022-07-15 RX ADMIN — SODIUM CHLORIDE: 9 INJECTION, SOLUTION INTRAVENOUS at 11:30

## 2022-07-15 RX ADMIN — DEXMEDETOMIDINE HYDROCHLORIDE 4 MCG: 100 INJECTION, SOLUTION INTRAVENOUS at 11:55

## 2022-07-15 RX ADMIN — SUGAMMADEX 200 MG: 100 INJECTION, SOLUTION INTRAVENOUS at 12:07

## 2022-07-15 RX ADMIN — SODIUM CHLORIDE, SODIUM LACTATE, POTASSIUM CHLORIDE, AND CALCIUM CHLORIDE: .6; .31; .03; .02 INJECTION, SOLUTION INTRAVENOUS at 11:48

## 2022-07-15 RX ADMIN — SODIUM CHLORIDE: 9 INJECTION, SOLUTION INTRAVENOUS at 11:14

## 2022-07-15 RX ADMIN — LIDOCAINE HYDROCHLORIDE 80 MG: 20 INJECTION, SOLUTION EPIDURAL; INFILTRATION; INTRACAUDAL; PERINEURAL at 11:34

## 2022-07-15 RX ADMIN — Medication 80 MCG: at 12:10

## 2022-07-15 RX ADMIN — DEXMEDETOMIDINE HYDROCHLORIDE 4 MCG: 100 INJECTION, SOLUTION INTRAVENOUS at 12:00

## 2022-07-15 RX ADMIN — ROCURONIUM BROMIDE 40 MG: 10 SOLUTION INTRAVENOUS at 11:34

## 2022-07-15 RX ADMIN — Medication 80 MCG: at 12:14

## 2022-07-15 RX ADMIN — PROPOFOL 100 MG: 10 INJECTION, EMULSION INTRAVENOUS at 11:34

## 2022-07-15 RX ADMIN — ESMOLOL HYDROCHLORIDE 20 MG: 10 INJECTION, SOLUTION INTRAVENOUS at 11:32

## 2022-07-15 RX ADMIN — Medication 80 MCG: at 11:48

## 2022-07-15 RX ADMIN — DEXMEDETOMIDINE HYDROCHLORIDE 4 MCG: 100 INJECTION, SOLUTION INTRAVENOUS at 11:50

## 2022-07-15 ASSESSMENT — PAIN SCALES - GENERAL: PAINLEVEL_OUTOF10: 0

## 2022-07-15 ASSESSMENT — PAIN - FUNCTIONAL ASSESSMENT: PAIN_FUNCTIONAL_ASSESSMENT: 0-10

## 2022-07-15 ASSESSMENT — COPD QUESTIONNAIRES: CAT_SEVERITY: SEVERE

## 2022-07-15 NOTE — PRE SEDATION
Sedation Pre-Procedure Note    Patient Name: Choco Pena   YOB: 1950  Room/Bed: [facility] Endo Pool/NONE  Medical Record Number: [id number]  Date: 7/15/2022   Time: 11:02 AM       Indication: Subcarinal mass, hilar adenopathy, precarinal adenopathy, RLL collapse, smoker    Consent: I have discussed with the patient and her daughter in clinic the indication, alternatives, and the possible risks and/or complications of the planned procedure and the anesthesia methods. Discussed with anesthesiology, Dr. Karine Adorno, ICU attending and NP. The patient and/or patient representative appear to understand and agree to proceed. Vital Signs:   Vitals:    07/15/22 1046   BP: (!) 145/96   Pulse: 69   Resp: 20   Temp: 96.9 °F (36.1 °C)   SpO2: 95%       Past Medical History:   has a past medical history of AAA (abdominal aortic aneurysm) ([facility] [address] CAD (coronary artery disease), COPD (chronic obstructive pulmonary disease) ([facility] [address] Hyperlipidemia, Hypertension, [facility] and Thyroid disease. Past Surgical History:   has a past surgical history that includes Coronary angioplasty with stent. Medications:   Scheduled Meds:   Continuous Infusions:   PRN Meds:   Home Meds:   Prior to Admission medications    Medication Sig Start Date End Date Taking?  Authorizing Provider   clopidogrel (PLAVIX) 75 MG tablet Take 75 mg by mouth daily   Yes Historical Provider, MD   aspirin 325 MG tablet Take 325 mg by mouth daily   Yes Historical Provider, MD   sodium bicarbonate 325 MG tablet Take 325 mg by mouth 2 times daily    Historical Provider, MD   albuterol (PROVENTIL) (2.5 MG/3ML) 0.083% nebulizer solution USE 1 VIAL IN NEBULIZER EVERY 4 TO 6 HOURS AS NEEDED 5/16/22   Historical Provider, MD   albuterol sulfate HFA (PROVENTIL;VENTOLIN;PROAIR) 108 (90 Base) MCG/ACT inhaler INHALE 2 PUFFS BY MOUTH EVERY 4 TO 6 HOURS AS NEEDED 5/16/22   Historical Provider, MD   amLODIPine-benazepril (LOTREL) 5-40 MG per capsule Take 1 capsule by mouth daily 3/15/22   Historical Provider, MD   atorvastatin (LIPITOR) 80 MG tablet Take 80 mg by mouth daily 6/17/22   Historical Provider, MD   Levothyroxine Sodium 25 MCG CAPS Take 25 mg by mouth daily    Historical Provider, MD   metoprolol tartrate (LOPRESSOR) 25 MG tablet Take 25 mg by mouth 2 times daily  6/22/22   Historical Provider, MD     Coumadin Use Last 7 Days:  no  Antiplatelet drug therapy use last 7 days: no  Other anticoagulant use last 7 days: no  Additional Medication Information:  Medications reviewed      Pre-Sedation Documentation and Exam:   I have personally completed a history, physical exam & review of systems for this patient (see notes). No change in history. Mallampati Airway Assessment:  Mallampati Class III- (soft palate, fauces & uvula are visible)    Prior History of Anesthesia Complications:   none    ASA Classification:  Class 3 - A patient with severe systemic disease that limits activity but is not incapacitating. Will type and cross, place 2 IV lines    Sedation/ Anesthesia Plan:   general    Medications Planned:   Per anesthesiology    Patient is an appropriate candidate for plan of sedation: Yes, with risk posed by AAA. Patient and family explained the risk, discussed with Dr. Jammie Cintron, no contraindication for bronchoscopy or anesthesia. Will attempt to control BP and HR intraop. Informed patient we will observe her for a longer period after the procedure.     Electronically signed by Maty Herbert MD on 7/15/2022 at 11:02 AM

## 2022-07-15 NOTE — DISCHARGE INSTRUCTIONS
PATIENT INSTRUCTIONS  POST-SEDATION    Josefina Hargrove        IMMEDIATELY FOLLOWING PROCEDURE:     1) Do not drive or operate machinery for the first twenty four hours after surgery. 2) Do not make any important decisions for twenty four hours after surgery or while taking narcotic pain medications or sedatives. 3) You should NOT BE LEFT UNATTENDED OR ALONE. A responsible adult should be with you for the rest of the day of your procedure and also during the night for your protection and safety. 4) You may experience some light headedness. Rest at home with activity as tolerated. You may not need to go to bed, but it is important to rest for the next 24 hours. You should not engage in athletic sports such as basketball, volleyball, jogging, skating, or activities requiring refined motor skills for 24 hours. 5) If you develop intractable nausea and vomiting or a severe headache please notify your doctor immediately. 6) You are not expected to have any fever, but if you feel warm, take your temperature. If you have a fever 101 degrees or higher, call your doctor. If you have had an Endoscopy:     ** Eat lightly for your first meal and gradually resume your normal / prescribed diet. ** If you have a sore throat you may use lozenges, or salt water gargles. ONCE YOU ARE HOME, IF YOU SHOULD HAVE:    Difficulty in breathing, persistent nausea or vomiting, bleeding you feel is excessive, or pain that is unusual, increased abdominal bloating, or any swelling, fever / chills, call your physician. If you cannot contact your physician, but feel that your signs and symptoms need a physician's attention, go to the Emergency Department. FOLLOW-UP:      Please follow up with Dr. Zaria Linder as scheduled. Call Dr. Radha Barrett if there are any respiratory concerns. BRONCHOSCOPY:    1) Nothing to eat or drink for 2 hours after procedure.  Then start with sips of water, if no difficulty, may drink and eat. 2) Go home and rest for the rest of the day after procedure. 3) May return to normal activity the next day after procedure. 4) You may cough up a few specks of blood, but anything more than specks, (1) one tablespoon or greater, call your physician. If unavailable, go to the nearest emergency room.     5) Remember to report any of the following symptoms to your physician:      -  Increased difficulty in breathing      -  Chest discomfort      -  Coughing up of blood (as noted above)      -  Fever (temperature greater than 100 degrees F. or when associated with shaking, chills, or shivering)      -  Other unusual symptoms that concern you

## 2022-07-15 NOTE — PROCEDURES
PROCEDURE: Fiberoptic bronchoscopy with endobronchial brushing, washing, endobronchial ultrasound-guided biopsy of lymph nodes stations 7 and 4R    INDICATION: Subcarinal mass, R pretracheal adenopathy, RLL collpase, smoker    DESCRIPTION OF PROCEDURE: Informed consent was obtained from the patient after explaining the risks and benefits. The procedure was discussed in extensive detail with the patient and her daughter. I also discussed with Dr. Adriano Burns from vascular surgery, anesthesiology A time out was performed. Anesthesia was  provided by the anesthesia team. Please see their notes for intubation, maintenance and reversal of anesthesia, extubation and recovery in [facility]. An initial complete airway examination was performed with a diagnostic bronchoscope. The trachea was normal, there was rounding of the lobito anteriorly, posteriorly the lobito was sharp. The left bronchial tree showed normal anatomy, minimal secretions, no endobronchial lesions. Upper lobe bronchus showed crowding but no endobronchial lesions. The bronchus intermedius showed mucosal/submucosal tumor completely occluding the distal bronchial tube. The right middle lobe bronchus, right lower lobe bronchus were not visible. Under direct visualization, I performed brushings from the occluding lesion. I did not attempt to pass the brush distally. There was oozing which was treated topically with 1:10,000 dilution epinephrine, 4 mL in all. After completing this part of the procedure, the bronchoscope was removed and replaced with an EBUS bronchoscope. A large subcarinal mass and a right pretracheal lymph nodes were visualized. I performed 1 pass into the subcarinal node, station 7, and then performed the second pass into the right pretracheal/paratracheal node, station 4R. The 4R station aspirate revealed blood, therefore 2 more biopsies were obtained from the station 7 node. MARY JANE suggested lymphoma or small cell lung cancer.   I then removed the EBUS bronchoscope and replaced it with the therapeutic bronchoscope. Saline and suctioning was performed, hemostasis was confirmed. Washings were collected for cytology. At this point the procedure was terminated, the patient was extubated by anesthesiology. She did have mild hypotension at the beginning of the procedure, but otherwise remained hemodynamically stable without tachycardia, hypertension. The procedure was discussed in detail with the patient's daughter, bronchoscopic and EBUS images shown to her. The patient tolerated the procedure well, blood loss 15 mL. Final results will be shared once they are available. In the event of severe symptoms or if the patient is unable to reach my office, instructions are given to proceed to the emergency department.

## 2022-07-15 NOTE — ANESTHESIA PRE PROCEDURE
Department of Anesthesiology  Preprocedure Note       Name:  Crystal Mai   Age:  74 y.o.  :  1950                                          MRN:  [id number]         Date:  7/15/2022      Surgeon: Abbie Jolley):  Vika Francisco MD    Procedure: Procedure(s):  BRONCHOSCOPY ENDOBRONCHIAL ULTRASOUND    Medications prior to admission:   Prior to Admission medications    Medication Sig Start Date End Date Taking? Authorizing Provider   clopidogrel (PLAVIX) 75 MG tablet Take 75 mg by mouth daily   Yes Historical Provider, MD   aspirin 325 MG tablet Take 325 mg by mouth daily   Yes Historical Provider, MD   sodium bicarbonate 325 MG tablet Take 325 mg by mouth 2 times daily    Historical Provider, MD   albuterol (PROVENTIL) (2.5 MG/3ML) 0.083% nebulizer solution USE 1 VIAL IN NEBULIZER EVERY 4 TO 6 HOURS AS NEEDED 22   Historical Provider, MD   albuterol sulfate HFA (PROVENTIL;VENTOLIN;PROAIR) 108 (90 Base) MCG/ACT inhaler INHALE 2 PUFFS BY MOUTH EVERY 4 TO 6 HOURS AS NEEDED 22   Historical Provider, MD   amLODIPine-benazepril (LOTREL) 5-40 MG per capsule Take 1 capsule by mouth daily 3/15/22   Historical Provider, MD   atorvastatin (LIPITOR) 80 MG tablet Take 80 mg by mouth daily 22   Historical Provider, MD   Levothyroxine Sodium 25 MCG CAPS Take 25 mg by mouth daily    Historical Provider, MD   metoprolol tartrate (LOPRESSOR) 25 MG tablet Take 25 mg by mouth 2 times daily  22   Historical Provider, MD       Current medications:    No current facility-administered medications for this encounter.      Current Outpatient Medications   Medication Sig Dispense Refill    clopidogrel (PLAVIX) 75 MG tablet Take 75 mg by mouth daily      aspirin 325 MG tablet Take 325 mg by mouth daily      sodium bicarbonate 325 MG tablet Take 325 mg by mouth 2 times daily      albuterol (PROVENTIL) (2.5 MG/3ML) 0.083% nebulizer solution USE 1 VIAL IN NEBULIZER EVERY 4 TO 6 HOURS AS NEEDED      albuterol sulfate HFA (PROVENTIL;VENTOLIN;PROAIR) 108 (90 Base) MCG/ACT inhaler INHALE 2 PUFFS BY MOUTH EVERY 4 TO 6 HOURS AS NEEDED      amLODIPine-benazepril (LOTREL) 5-40 MG per capsule Take 1 capsule by mouth daily      atorvastatin (LIPITOR) 80 MG tablet Take 80 mg by mouth daily      Levothyroxine Sodium 25 MCG CAPS Take 25 mg by mouth daily      metoprolol tartrate (LOPRESSOR) 25 MG tablet Take 25 mg by mouth 2 times daily          Allergies:  No Known Allergies    Problem List:    Patient Active Problem List   Diagnosis Code    Mediastinal adenopathy R59.0    Hilar adenopathy R59.0       Past Medical History:        Diagnosis Date    AAA (abdominal aortic aneurysm) (HCC)     CAD (coronary artery disease)     COPD (chronic obstructive pulmonary disease) (HCC)     Hyperlipidemia     Hypertension     MI (myocardial infarction) ([facility] [address]     Thyroid disease     hypothyroidism       Past Surgical History:        Procedure Laterality Date    CORONARY ANGIOPLASTY WITH STENT PLACEMENT      x2       Social History:    Social History     Tobacco Use    Smoking status: Every Day     Packs/day: 0.25     Types: Cigarettes     Start date: 5/6/1960    Smokeless tobacco: Never   Substance Use Topics    Alcohol use: Not Currently                                Ready to quit: Not Answered  Counseling given: Not Answered      Vital Signs (Current):   Vitals:    07/13/22 0856   Weight: 140 lb (63.5 kg)   Height: 5' 4\" (1.626 m)                                              BP Readings from Last 3 Encounters:   07/11/22 (!) 154/98   07/01/22 (!) 128/98   06/30/22 122/78       NPO Status:                                                                                 BMI:   Wt Readings from Last 3 Encounters:   07/11/22 141 lb 9.6 oz (64.2 kg)   07/01/22 140 lb (63.5 kg)   06/30/22 140 lb (63.5 kg)     Body mass index is 24.03 kg/m².     CBC:   Lab Results   Component Value Date/Time    WBC 10.2 06/30/2022 02:58 PM    RBC 4.31 06/30/2022 02:58 PM    HGB 13.3 06/30/2022 02:58 PM    HCT 38.2 06/30/2022 02:58 PM    MCV 88.7 06/30/2022 02:58 PM    RDW 14.2 06/30/2022 02:58 PM     06/30/2022 02:58 PM       CMP: No results found for: NA, K, CL, CO2, BUN, CREATININE, GFRAA, AGRATIO, LABGLOM, GLUCOSE, GLU, PROT, CALCIUM, BILITOT, ALKPHOS, AST, ALT    POC Tests: No results for input(s): POCGLU, POCNA, POCK, POCCL, POCBUN, POCHEMO, POCHCT in the last 72 hours. Coags:   Lab Results   Component Value Date/Time    PROTIME 12.7 06/30/2022 02:58 PM    INR 0.97 06/30/2022 02:58 PM       HCG (If Applicable): No results found for: PREGTESTUR, PREGSERUM, HCG, HCGQUANT     ABGs: No results found for: PHART, PO2ART, WQS0GVZ, FGQ7TRH, BEART, P6RBSQGZ     Type & Screen (If Applicable):  No results found for: LABABO, LABRH    Drug/Infectious Status (If Applicable):  No results found for: HIV, HEPCAB    COVID-19 Screening (If Applicable): No results found for: COVID19        Anesthesia Evaluation  Patient summary reviewed no history of anesthetic complications:   Airway: Mallampati: I  TM distance: >3 FB   Neck ROM: full  Mouth opening: > = 3 FB   Dental: normal exam   (+) upper dentures      Pulmonary:normal exam  breath sounds clear to auscultation  (+) COPD (\"supposed to be on nebulizer every 6 hours\", did not take today): severe,                             Cardiovascular:    (+) hypertension: no interval change, past MI: no interval change, CAD: no interval change,       ECG reviewed  Rhythm: regular  Rate: normal  Echocardiogram reviewed                  Neuro/Psych:               GI/Hepatic/Renal:             Endo/Other:                     Abdominal:             Vascular:   + PVD, aortic or cerebral (AAA in need of vascular repair, surgery following), . Other Findings:           Anesthesia Plan      general     ASA 3       Induction: intravenous.       Anesthetic plan and risks discussed with patient (including risks of vascular concerns). Use of blood products discussed with patient whom. Plan discussed with CRNA.                     Sharri Meeks MD   7/15/2022

## 2022-07-15 NOTE — ANESTHESIA POSTPROCEDURE EVALUATION
Department of Anesthesiology  Postprocedure Note    Patient: Luisito November  MRN: [id number]  YOB: 1950  Date of evaluation: 7/15/2022      Procedure Summary     Date: 07/15/22 Room / Location: [facility] [address] [address] [facility]    Anesthesia Start: 36 Anesthesia Stop: 9738    Procedures:       BRONCHOSCOPY ENDOBRONCHIAL ULTRASOUND      BRONCHOSCOPY/TRANSBRONCHIAL NEEDLE BIOPSY      BRONCHOSCOPY/TRANSBRONCHIAL NEEDLE BIOPSY ADDL LOBE      BRONCHOSCOPY BRUSHINGS Diagnosis:       Hilar adenopathy      Mediastinal adenopathy      Lung collapse      Abdominal aortic aneurysm (AAA) without rupture (HCC)      (HILAR AND MEDIASTINAL ADENOPATHY, LUNG COLLAPSE, AAA)    Surgeons: Adalberto Sharp MD Responsible Provider: Danelle Tan MD    Anesthesia Type: general ASA Status: 3          Anesthesia Type: No value filed.     Hayley Phase I: Hayley Score: 10    Hayley Phase II: Hayley Score: 10      Anesthesia Post Evaluation    Patient location during evaluation: PACU  Patient participation: complete - patient participated  Level of consciousness: awake and alert  Airway patency: patent  Nausea & Vomiting: no nausea and no vomiting  Complications: no  Cardiovascular status: hemodynamically stable  Respiratory status: acceptable, room air and spontaneous ventilation  Hydration status: stable

## (undated) DEVICE — KIT COLON W/ 1.1OZ LUB AND 2 END

## (undated) DEVICE — SYRINGE MED 10ML SLIP TIP BLNT FILL AND LUERLOCK DISP

## (undated) DEVICE — ELECTRODE,RADIOTRANSLUCENT,FOAM,3PK: Brand: MEDLINE

## (undated) DEVICE — SINGLE USE SUCTION VALVE MAJ-209: Brand: SINGLE USE SUCTION VALVE (STERILE)

## (undated) DEVICE — SYRINGE MED 50ML LUERSLIP TIP

## (undated) DEVICE — GOWN ISOLATN REGULAR L BLU POLYPR POLY OVR HD NK OPN BK

## (undated) DEVICE — TUBING, SUCTION, 3/16" X 6', STRAIGHT: Brand: MEDLINE

## (undated) DEVICE — YANKAUER,BULB TIP,W/O VENT,RIGID,STERILE: Brand: MEDLINE

## (undated) DEVICE — TUBING, SUCTION, 3/16" X 12', STRAIGHT: Brand: MEDLINE

## (undated) DEVICE — CANNULA,OXY,ADULT,SUPERSOFT,W/7'TUB,SC: Brand: MEDLINE INDUSTRIES, INC.

## (undated) DEVICE — CONMED SCOPE SAVER BITE BLOCK, 20X27 MM: Brand: SCOPE SAVER

## (undated) DEVICE — NEEDLE ASPIR 21GA L700MM US GUID TREAT DST END FOR EFFICIENT

## (undated) DEVICE — BRONCHOSCOPE CYTOLOGY BRUSH: Brand: COOK

## (undated) DEVICE — TRAP,MUCUS SPECIMEN, 80CC: Brand: MEDLINE

## (undated) DEVICE — BOWL MED M 16OZ PLAS CAP GRAD

## (undated) DEVICE — SINGLE USE BIOPSY VALVE MAJ-210: Brand: SINGLE USE BIOPSY VALVE (STERILE)